# Patient Record
(demographics unavailable — no encounter records)

---

## 2024-10-29 NOTE — DISCUSSION/SUMMARY
[FreeTextEntry1] : Fidel SEAY continue Eliquis  change metoprolol 50mg bid to metoprolol 100mg daily  stopped sotalol/flecanide in 2023  exercise as much as possible.  Patient in  afib 99% patient will call to go over blood work next week.  bloodwork/echo after 11.20/4 months

## 2024-10-29 NOTE — HISTORY OF PRESENT ILLNESS
[FreeTextEntry1] : pt with HTN, PAF on elqiuis with no issues, obesity, GEENA, LVH MOD MR, DIASTOLIC function indeterminate. PT DOES NOT FEEL AFIB.   pt saw dr. derrick hernandez and had sotalol and fleicanide stopped and patient started on metoprolol and doing well.  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  pt not exercising much.  pt says palpitations are improved  ECHO: SEVERE LAE, MOD TR, ELEVATED LAP.   4/27/23:  pt feels much better since stopped sotalol and fleicanide. pt says has less episodes now and not nervous and has not felt anymore episodes of afib but may not feel it. pt on metoprolol. pt wash windows up and down stairs and has no issues. pt does not exercise. no bleeding and sinus rhythm pt snores heavily.   11/30/23: 11/20/23: EF: 56%. LVMI: 124, E/e': >15, severe GEENA, mild-mod MR, mild AR, mild LVH, pt working and walks a mile to park his car. pt not exercising but working and says feels fine, pt does not have fast heart rates and no complaints. Pt in afib and no sob and no cp. PT HAS NO PROBLEMS SINCE STOPPING FLECAINIDE AND SOTALOL. pt taking metoprolol, no bleeding.   8/27/24: pt did not get bloodwork as got over covid. pt was congested and was mild symptoms.  pt has no complaints, pt gained weight and not sure how. 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  pt denies cp or sob.  pt does not feel afib. but denies any episodes.  pt feels better off on sotalol and fleicanide.   10/29/24: MCOT: 52-152BPM, AVG HR: 91BPM, AF BURDEN: 99%, NO PATIENT TRIGGERS  The patient denies CP, bleeding, SOB at rest, PND, abdominal discomfort, LH/dizziness, BLE edema, palpitations, and syncope.  Patient getting blood work done over the weekend.

## 2024-12-10 NOTE — DISCUSSION/SUMMARY
[FreeTextEntry1] : Fidel SEAY continue Eliquis  change metoprolol 50mg bid to metoprolol 100mg daily  stopped sotalol/flecanide in 2023  exercise as much as possible.  Patient in  afib 99% patient will call to go over blood work next week.  bloodwork/echo after 11.20/4 months  Randy Bledsoe MD

## 2025-04-18 NOTE — DISCUSSION/SUMMARY
[FreeTextEntry1] : GEENA Afclarence continue Eliquis  change metoprolol 50mg bid to metoprolol 100mg daily  stopped sotalol/flecanide in 2023  exercise as much as possible.  Patient in  afib 99% patient will call to go over blood work next week.  discussed starting glp medications next visit after echo  bloodwork/echo/carotid/4 months

## 2025-04-18 NOTE — HISTORY OF PRESENT ILLNESS
[FreeTextEntry1] : pt with HTN, PAF on elqiuis with no issues, obesity, GEENA, LVH MOD MR, DIASTOLIC function indeterminate. PT DOES NOT FEEL AFIB.   pt saw dr. derrick hernandez and had sotalol and fleicanide stopped and patient started on metoprolol and doing well.  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  pt not exercising much.  pt says palpitations are improved  ECHO: SEVERE LAE, MOD TR, ELEVATED LAP.   23:  pt feels much better since stopped sotalol and fleicanide. pt says has less episodes now and not nervous and has not felt anymore episodes of afib but may not feel it. pt on metoprolol. pt wash windows up and down stairs and has no issues. pt does not exercise. no bleeding and sinus rhythm pt snores heavily.   23: 23: EF: 56%. LVMI: 124, E/e': >15, severe GEENA, mild-mod MR, mild AR, mild LVH, pt working and walks a mile to park his car. pt not exercising but working and says feels fine, pt does not have fast heart rates and no complaints. Pt in afib and no sob and no cp. PT HAS NO PROBLEMS SINCE STOPPING FLECAINIDE AND SOTALOL. pt taking metoprolol, no bleeding.   24: pt did not get bloodwork as got over covid. pt was congested and was mild symptoms.  pt has no complaints, pt gained weight and not sure how. 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  pt denies cp or sob.  pt does not feel afib. but denies any episodes.  pt feels better off on sotalol and fleicanide.   10/29/24: MCOT: 52-152BPM, AVG HR: 91BPM, AF BURDEN: 99%, NO PATIENT TRIGGERS  The patient denies CP, bleeding, SOB at rest, PND, abdominal discomfort, LH/dizziness, BLE edema, palpitations, and syncope.  Patient getting blood work done over the weekend.   25: 25: HDL: 51, T, LDL: 78, BNP: 786 The patient denies CP, bleeding, SOB at rest, PND, abdominal discomfort, LH/dizziness, BLE edema, palpitations, and syncope.